# Patient Record
Sex: FEMALE | Race: WHITE | NOT HISPANIC OR LATINO | Employment: OTHER | URBAN - METROPOLITAN AREA
[De-identification: names, ages, dates, MRNs, and addresses within clinical notes are randomized per-mention and may not be internally consistent; named-entity substitution may affect disease eponyms.]

---

## 2021-11-09 ENCOUNTER — TELEPHONE (OUTPATIENT)
Dept: GASTROENTEROLOGY | Facility: CLINIC | Age: 71
End: 2021-11-09
Payer: COMMERCIAL

## 2021-11-09 NOTE — TELEPHONE ENCOUNTER
[facility] Call Center    Phone Message    May a detailed message be left on voicemail: yes     Reason for Call: Other: Pt is requesting a call back. Thank you.     Action Taken: Message routed to:  [facility]  gi    Travel Screening: Not Applicable

## 2021-11-09 NOTE — TELEPHONE ENCOUNTER
From: Brayden Miller MD   Sent: 2021   3:19 PM CDT   To: Maria Elena Arboleda RN   Subject: Luc[facility] patient                             Flor Camarena from [facility] said she submitted a referral for this patient with recurrent C. Diff.     Karlene Naik    1950   Telephone: [phone]     The patient is not in our system that I can see. Can you schedule her please for ? I talked to the patient -- seems appropriate. However, I don't have records.       - Patient has been registered and scheduled.  Message left for patient to return call, will need verbal consent to gain access to health systems with history of C.diff in Care Everywhere.    Maria Elena Arboleda RN

## 2021-11-10 NOTE — TELEPHONE ENCOUNTER
Patient contacted, gives verbal consent to access her [facility] records through Care Everywhere.  Patient states she was sent a text to set up MyChart but is having difficulty.   Sent email to patient for MyChart activation.      Maria Elena Arboleda RN

## 2021-11-24 ENCOUNTER — OFFICE VISIT (OUTPATIENT)
Dept: GASTROENTEROLOGY | Facility: CLINIC | Age: 71
End: 2021-11-24
Payer: COMMERCIAL

## 2021-11-24 VITALS
SYSTOLIC BLOOD PRESSURE: 143 MMHG | WEIGHT: 161.4 LBS | HEART RATE: 78 BPM | OXYGEN SATURATION: 98 % | DIASTOLIC BLOOD PRESSURE: 86 MMHG

## 2021-11-24 DIAGNOSIS — A04.71 RECURRENT CLOSTRIDIOIDES DIFFICILE DIARRHEA: ICD-10-CM

## 2021-11-24 DIAGNOSIS — B37.31 CANDIDIASIS OF VAGINA: Primary | ICD-10-CM

## 2021-11-24 PROCEDURE — 99205 OFFICE O/P NEW HI 60 MIN: CPT | Performed by: INTERNAL MEDICINE

## 2021-11-24 RX ORDER — ASPIRIN 81 MG/1
81 TABLET ORAL DAILY
COMMUNITY

## 2021-11-24 RX ORDER — GLUCOSAMINE/CHONDR SU A SOD 750-600 MG
1 TABLET ORAL DAILY
COMMUNITY

## 2021-11-24 RX ORDER — HYDROCORTISONE 2.5 %
CREAM (GRAM) TOPICAL 2 TIMES DAILY PRN
COMMUNITY
Start: 2021-09-27

## 2021-11-24 RX ORDER — VANCOMYCIN HYDROCHLORIDE 125 MG/1
125 CAPSULE ORAL DAILY
COMMUNITY
Start: 2021-11-04 | End: 2021-12-04

## 2021-11-24 RX ORDER — ACETAMINOPHEN 500 MG
1000 TABLET ORAL DAILY
COMMUNITY

## 2021-11-24 RX ORDER — ATORVASTATIN CALCIUM 40 MG/1
1 TABLET, FILM COATED ORAL DAILY
COMMUNITY
Start: 2021-10-13

## 2021-11-24 RX ORDER — ESTRADIOL 0.1 MG/G
CREAM VAGINAL
COMMUNITY
Start: 2021-10-13

## 2021-11-24 RX ORDER — FLUCONAZOLE 150 MG/1
150 TABLET ORAL ONCE
Qty: 1 TABLET | Refills: 0 | Status: SHIPPED | OUTPATIENT
Start: 2021-11-24 | End: 2021-11-24

## 2021-11-24 ASSESSMENT — PAIN SCALES - GENERAL: PAINLEVEL: NO PAIN (0)

## 2021-11-24 NOTE — PROGRESS NOTES
Visit Date: 11/24/2021    HISTORY OF PRESENT ILLNESS:  The patient is a 71-year-old woman who was referred from [facility] by Flor Camarena for evaluation of recurrent C. difficile infections and possible treatment.  The patient had exposure to clindamycin in mid July of this year and developed diarrhea end of July, beginning of August.  On 0816/2021, she was diagnosed with C. difficile infection by stool testing.  Other enteric pathogens were negative.  Abdominal CT at the time was unremarkable.  She was having 6-8 bowel movements per day with mucus, some blood, urgency and near incontinence, although she denies fecal incontinence.  She was given a 10-day course of vancomycin, which helped; however, 6-8 days after discontinuing it, she had a relapse of her symptoms.  She was put on a vancomycin taper at that time and relapsed after that with a documented positive test on 10/22/2021.  She has been on vancomycin since that time again with symptom control.  Her past medical history is notable for increased cholesterol, esophageal reflux disease, for which she is on omeprazole, osteoarthritis.  Her surgical history is quite limited.  She had tonsillectomy and tubal ligation.  She does not have frequent infections.  She gets urinary tract infections about once or twice a year.  She did have some dysuria in the middle of the C. diff story, but that was treated with cranberry juice.  The patient has history of some diarrheal illness that happened in 1980s for 6 months or so and then developed some post-infection irritable bowel syndrome, which apparently resolved when she limited her coffee intake and has not been a problem since.  She had a colonoscopy done in 2015 notable for diverticulosis.  She carries a history of having colon polyps.  She was told to repeat the colonoscopy in 5 years, which she is not planning to do because of bad experiences with colonoscopies before including reaction to sedatives.  Recently,  she has developed perianal, perivulvar rash, which is somewhat painful and itchy, and she was wondering if that is a reaction to the estrogen cream that she was prescribed or an allergic reaction to vancomycin.    PHYSICAL EXAMINATION:  The patient is in no acute distress and vitals are stable.  I did examine the perianal area, which looks erythematous and sharply demarcated with a couple of small red spots around most suggestive of a candidal infection.    SOCIAL HISTORY:  The patient has 2 daughters, both were vaginal deliveries and healthy.  One of the daughters is in [place], the other lives close by in [place] and [facility]    ASSESSMENT AND PLAN:    1.  The patient is a good candidate for intestinal microbiota transplant given that there is no diagnostic uncertainty.  Oral capsules is the preferred method and this will be arranged next week.  The patient should stay on vancomycin until 2 days before the oral capsules are administered.  I discussed the investigational nature of intestinal microbiota transplant, as well as multiple theoretical risk factors.  The treatment has approximately 80% success in breaking the cycle of C. difficile recurrence.  We do have a protocol for failures which also involves the capsules.  The patient is hesitant to have colonoscopy, but that is a potential third option in case the first two fail.  2.  The patient has likely vaginal candidiasis.  I prescribed 150 mg of fluconazole.  3.  History of colon polyps, last colonoscopy was done when she was 65 years old, and at that time, she had diverticulosis and no polyps.  It may be reasonable to continue colon cancer screening with less invasive means such as a Cologuard, although that can be positive if she continues to experience hemorrhoidal bleeding.    Total time in this visit was 75 minutes including review of medical records, documentation, coordination of care, and the visit itself.    Brayden Miller,  MD        D: 2021   T: 2021   MT: JACKIE    Name:     JOHNY RÍOS  MRN:      [id number]        Account:    [id number]   :      1950           Visit Date: 2021     Document: [id number]

## 2021-11-24 NOTE — NURSING NOTE
Karlene Naik's goals for this visit include:   Chief Complaint   Patient presents with     Consult     Recurrent C. Diff       She requests these members of her care team be copied on today's visit information: PCP    PCP: Flor Camarena    Referring Provider:  No referring provider defined for this encounter.    BP (!) 143/86 (BP Location: Left arm, Patient Position: Sitting, Cuff Size: Adult Regular)   Pulse 78   Wt 73.2 kg (161 lb 6.4 oz)   SpO2 98%     Do you need any medication refills at today's visit? No    Rani Hanna LPN

## 2021-11-24 NOTE — LETTER
11/24/2021         RE: Karlene Naik  [id number] [facility] [address] [address]        Dear Colleague,    Thank you for referring your patient, Karlene Naik, to the [facility]. Please see a copy of my visit note below.    Note dictated.      Again, thank you for allowing me to participate in the care of your patient.        Sincerely,        Brayden Miller MD

## 2021-12-01 DIAGNOSIS — B37.31 CANDIDIASIS OF VAGINA: Primary | ICD-10-CM

## 2021-12-01 RX ORDER — FLUCONAZOLE 150 MG/1
150 TABLET ORAL DAILY
Qty: 3 TABLET | Refills: 0 | Status: SHIPPED | OUTPATIENT
Start: 2021-12-01 | End: 2021-12-04

## 2021-12-12 ENCOUNTER — HEALTH MAINTENANCE LETTER (OUTPATIENT)
Age: 71
End: 2021-12-12

## 2022-01-04 ENCOUNTER — ALLIED HEALTH/NURSE VISIT (OUTPATIENT)
Dept: GASTROENTEROLOGY | Facility: CLINIC | Age: 72
End: 2022-01-04
Payer: COMMERCIAL

## 2022-01-04 DIAGNOSIS — A04.71 RECURRENT CLOSTRIDIOIDES DIFFICILE DIARRHEA: Primary | ICD-10-CM

## 2022-01-04 PROCEDURE — 99207 PR NO CHARGE LOS: CPT | Performed by: INTERNAL MEDICINE

## 2022-01-07 NOTE — PROGRESS NOTES
Writer met with pt to facilitate an Intestinal Microbiota Transplant via oral capsules.  Instructions and supporting handouts were provided and reviewed.   Informed consent form was completed, a copy will be mailed.  Pt is enrolled in a [facility] study that will assess body composition following IMT;   IRB [id number].    Last vancomycin taken morning of January 3, plan for capsule ingestion January 5, 2022.    Donor , 8.0 x 10^11 cells, lot prepared [id number]

## 2022-10-03 ENCOUNTER — HEALTH MAINTENANCE LETTER (OUTPATIENT)
Age: 72
End: 2022-10-03

## 2022-10-07 ENCOUNTER — ALLIED HEALTH/NURSE VISIT (OUTPATIENT)
Dept: NURSING | Facility: CLINIC | Age: 72
End: 2022-10-07
Payer: COMMERCIAL

## 2022-10-07 ENCOUNTER — TELEPHONE (OUTPATIENT)
Dept: GASTROENTEROLOGY | Facility: CLINIC | Age: 72
End: 2022-10-07

## 2022-10-07 VITALS
TEMPERATURE: 98.3 F | OXYGEN SATURATION: 99 % | HEART RATE: 72 BPM | RESPIRATION RATE: 20 BRPM | SYSTOLIC BLOOD PRESSURE: 154 MMHG | DIASTOLIC BLOOD PRESSURE: 92 MMHG | WEIGHT: 157 LBS

## 2022-10-07 DIAGNOSIS — R35.0 URINARY FREQUENCY: Primary | ICD-10-CM

## 2022-10-07 DIAGNOSIS — R39.15 URINARY URGENCY: Primary | ICD-10-CM

## 2022-10-07 PROBLEM — Z85.828 HISTORY OF BASAL CELL CARCINOMA: Status: ACTIVE | Noted: 2022-04-01

## 2022-10-07 PROBLEM — I47.19 ATRIAL TACHYCARDIA (H): Status: ACTIVE | Noted: 2019-08-22

## 2022-10-07 PROBLEM — Z86.19 HX OF CLOSTRIDIUM DIFFICILE INFECTION: Status: ACTIVE | Noted: 2021-10-13

## 2022-10-07 LAB
ALBUMIN UR-MCNC: NEGATIVE MG/DL
APPEARANCE UR: CLEAR
BILIRUB UR QL STRIP: NEGATIVE
COLOR UR AUTO: COLORLESS
GLUCOSE UR STRIP-MCNC: NEGATIVE MG/DL
HGB UR QL STRIP: ABNORMAL
KETONES UR STRIP-MCNC: NEGATIVE MG/DL
LEUKOCYTE ESTERASE UR QL STRIP: ABNORMAL
NITRATE UR QL: NEGATIVE
PH UR STRIP: 6 [PH] (ref 5–7)
RBC #/AREA URNS AUTO: ABNORMAL /HPF
SKIP: ABNORMAL
SP GR UR STRIP: 1 (ref 1–1.03)
UROBILINOGEN UR STRIP-MCNC: NORMAL MG/DL
WBC #/AREA URNS AUTO: ABNORMAL /HPF

## 2022-10-07 PROCEDURE — 96372 THER/PROPH/DIAG INJ SC/IM: CPT | Performed by: INTERNAL MEDICINE

## 2022-10-07 PROCEDURE — 81001 URINALYSIS AUTO W/SCOPE: CPT | Performed by: INTERNAL MEDICINE

## 2022-10-07 RX ORDER — GENTAMICIN 40 MG/ML
160 INJECTION, SOLUTION INTRAMUSCULAR; INTRAVENOUS ONCE
Status: COMPLETED | OUTPATIENT
Start: 2022-10-07 | End: 2022-10-07

## 2022-10-07 RX ADMIN — GENTAMICIN 160 MG: 40 INJECTION, SOLUTION INTRAMUSCULAR; INTRAVENOUS at 15:26

## 2022-10-07 ASSESSMENT — PAIN SCALES - GENERAL: PAINLEVEL: NO PAIN (0)

## 2022-10-07 NOTE — TELEPHONE ENCOUNTER
----- Message from Duglas Fan RN sent at 10/7/2022  1:55 PM CDT -----  Regarding: RE: URGENT  I attempted to call the patient. I left a message to call back.    Duglas  ----- Message -----  From: Brayden Miller MD  Sent: 10/7/2022   1:49 PM CDT  To: Dunia Arenas RN, #  Subject: URGENT                                           I sent this patient to the [facility] to get her UTI symptoms treated. The protocol is to get UA/UC and give a shot of gentamicin 160 mg IM. If UA is positive, she would get the second and third shots next week (80 mg each).    I am not sure how to communicate these Friday emergencies other than sending messages.    Aldair Lester

## 2022-10-07 NOTE — PROGRESS NOTES
Patient sent in to clinic by Dr. Miller for UTI sx. Per Dr. Miller to initiate Gentamicin IM protocol for UTI treatment in cdiff patient.     UA sent down to lab to be process. Per Dr. Miller give loading dose of gentamicin 160mg and then wait for UA to come back to proceed with further doses. Instructed patient that someone would give a call back once UA has been processed to determine if further doses are needed.     Patient had no further instructions at this time.     Clinic Administered Medication Documentation    Administrations This Visit     gentamicin (GARAMYCIN) injection 160 mg     Admin Date  10/07/2022 Action  New Bag Dose  160 mg Route  Intramuscular Site  Left Ventrogluteal Administered By  Duglas Fan RN    Ordering Provider: Brayden Miller MD    NDC: 75031-446-92    Lot#: [id number]    : [employer]    Patient Supplied?: No                  Injectable Medication Documentation    Patient was given Gentamicin Sulfate . Prior to medication administration, verified patients identity using patient s name and date of birth. Please see MAR and medication order for additional information. Patient instructed to remain in clinic for 15 minutes and report any adverse reaction to staff immediately .      Was entire vial of medication used? Yes  Vial/Syringe: Single dose vial  Expiration Date:  3/2023  Was this medication supplied by the patient? No    Name of provider who requested the medication administration: Brayden Miller MD  Name of provider on site (faculty or community preceptor) at the time of performing the medication administration: Vida Ba MD    Date of next administration: Possibly 10/10/22  Date of next office visit with provider to renew medication plan (must be seen annually):     Duglas Fan RN

## 2022-10-10 ENCOUNTER — ALLIED HEALTH/NURSE VISIT (OUTPATIENT)
Dept: NURSING | Facility: CLINIC | Age: 72
End: 2022-10-10
Payer: COMMERCIAL

## 2022-10-10 ENCOUNTER — TELEPHONE (OUTPATIENT)
Dept: GASTROENTEROLOGY | Facility: CLINIC | Age: 72
End: 2022-10-10

## 2022-10-10 VITALS
SYSTOLIC BLOOD PRESSURE: 133 MMHG | DIASTOLIC BLOOD PRESSURE: 79 MMHG | OXYGEN SATURATION: 99 % | HEART RATE: 79 BPM | TEMPERATURE: 97 F

## 2022-10-10 DIAGNOSIS — R35.0 URINARY FREQUENCY: Primary | ICD-10-CM

## 2022-10-10 DIAGNOSIS — R39.15 URINARY URGENCY: Primary | ICD-10-CM

## 2022-10-10 PROCEDURE — 96372 THER/PROPH/DIAG INJ SC/IM: CPT | Performed by: INTERNAL MEDICINE

## 2022-10-10 RX ORDER — GENTAMICIN 40 MG/ML
80 INJECTION, SOLUTION INTRAMUSCULAR; INTRAVENOUS ONCE
Status: COMPLETED | OUTPATIENT
Start: 2022-10-11 | End: 2022-10-11

## 2022-10-10 RX ORDER — GENTAMICIN 40 MG/ML
80 INJECTION, SOLUTION INTRAMUSCULAR; INTRAVENOUS ONCE
Status: COMPLETED | OUTPATIENT
Start: 2022-10-10 | End: 2022-10-10

## 2022-10-10 RX ADMIN — GENTAMICIN 80 MG: 40 INJECTION, SOLUTION INTRAMUSCULAR; INTRAVENOUS at 14:38

## 2022-10-10 NOTE — TELEPHONE ENCOUNTER
Attempted to reach pt to update on providers instruction to get 2nd and 3rd doses of gentamycin. No answer, detailed message left. Instructed to call back [phone] option 3.    Cristina Boyd RN

## 2022-10-10 NOTE — PROGRESS NOTES
Patient arrived for 2nd of 3 gentamicin doses for UTI treatment in [facility] patient   Denies any adverse reaction /issues from previous dose on 10/7/2022.   VSS.     Clinic Administered Medication Documentation    Administrations This Visit     gentamicin (GARAMYCIN) injection 80 mg     Admin Date  10/10/2022 Action  New Bag Dose  80 mg Route  Intramuscular Site  Left Ventrogluteal Administered By  Constance Abbasi RN    Ordering Provider: Brayden Miller MD    Patient Supplied?: No    Comments: Left ventrogluteal                Injectable Medication Documentation    Patient was given Gentamicin Sulfate . Prior to medication administration, verified patients identity using patient s name and date of birth. Please see MAR and medication order for additional information. Patient instructed to remain in clinic for 15 minutes and report any adverse reaction to staff immediately .      Was entire vial of medication used? Yes  Vial/Syringe: Single dose vial  Expiration Date:    Was this medication supplied by the patient? No    Name of provider who requested the medication administration: Dr. Miller  Name of provider on site (faculty or community preceptor) at the time of performing the medication administration: Rolf Berumen PA-C.    Date of next administration: 10/11/2022  Date of next office visit with provider to renew medication plan (must be seen annually):    Constance Abbasi RN

## 2022-10-10 NOTE — TELEPHONE ENCOUNTER
Patient call to schedule her 2nd and 3rd gentamicin doses. Patient will come in today at 230pm and again tomorrow at 230pm. Visits scheduled.    Cristina Boyd RN

## 2022-10-10 NOTE — TELEPHONE ENCOUNTER
Spoke with provider re: 2nd and 3 rd dose of gentamicin.   Provider stated that patient should get these completed.   Will reach out to patient to schedule.     Constance Abbasi RN

## 2022-10-11 ENCOUNTER — ALLIED HEALTH/NURSE VISIT (OUTPATIENT)
Dept: NURSING | Facility: CLINIC | Age: 72
End: 2022-10-11
Payer: COMMERCIAL

## 2022-10-11 DIAGNOSIS — R39.15 URINARY URGENCY: Primary | ICD-10-CM

## 2022-10-11 PROCEDURE — 96372 THER/PROPH/DIAG INJ SC/IM: CPT | Performed by: INTERNAL MEDICINE

## 2022-10-11 RX ADMIN — GENTAMICIN 80 MG: 40 INJECTION, SOLUTION INTRAMUSCULAR; INTRAVENOUS at 15:16

## 2022-10-11 NOTE — NURSING NOTE
Chief Complaint   Patient presents with     Imm/Inj     Gentamycin injection, 3rd dose.       Clinic Administered Medication Documentation          Injectable Medication Documentation    Patient was given Gentamicin Sulfate . Prior to medication administration, verified patients identity using patient s name and date of birth. Please see MAR and medication order for additional information. Patient instructed to remain in clinic for 15 minutes.    The following medication was given:     MEDICATION: Gentamycin  ROUTE: Injection  SITE: Ventrogluteal - Right  DOSE: 2mL  LOT #: [id number]  :  [employer]  EXPIRATION DATE:  03/2023  NDC#: 15204-097-96  Was entire vial of medication used? Yes  Vial/Syringe: Single dose vial  Was this medication supplied by the patient? No       This service provided today was under the supervising provider of the day Rolf Berumen PA-C, who was available if needed.        Johanna Lindsay CMA

## 2022-10-12 ENCOUNTER — DOCUMENTATION ONLY (OUTPATIENT)
Dept: GASTROENTEROLOGY | Facility: CLINIC | Age: 72
End: 2022-10-12

## 2022-10-12 NOTE — PROGRESS NOTES
Late entry.   10/11/2022 ~2:45pm  Patient inquired about a symptom after the second gentamicin injection on 10/10.   The injection was done around 2:30pm- patient staed that around 7pm she noted a rotten egg smell/taste. Writer inquired if she experienced that with the first injection the previous Friday. Patient stated not that she was aware of.   Patient stated the sx lasted until she went to bed around 10 pm, but when she awoke this AM, it was not present. Denies other sxs like fever, nausea, body aches, redness at site, etc.   Does not c/o this sx currently.   Writer informed patient to reach out if she experiences this same thing after the 3rd dose (10/11/2022). Patient verbalized understanding.     Constance Abbasi RN

## 2023-02-11 ENCOUNTER — HEALTH MAINTENANCE LETTER (OUTPATIENT)
Age: 73
End: 2023-02-11

## 2023-10-02 ENCOUNTER — TELEPHONE (OUTPATIENT)
Dept: GASTROENTEROLOGY | Facility: CLINIC | Age: 73
End: 2023-10-02
Payer: COMMERCIAL

## 2023-10-02 NOTE — TELEPHONE ENCOUNTER
[facility] Call Center    Phone Message    May a detailed message be left on voicemail: yes     Reason for Call: Other: Patient is having many episodes of diarrhea after her flu shot yesterday. Please call back to discuss     Action Taken: Message routed to:  [facility] gi    Travel Screening: Not Applicable

## 2023-10-03 NOTE — TELEPHONE ENCOUNTER
Attempted to reach patient, no answer, message left with call back info provided. Writer also sent a [employer] message to update as well.    Cristina Boyd RN

## 2023-10-04 NOTE — TELEPHONE ENCOUNTER
Patient responded to [employer] message that she will reach out to her Primary care physician.    Cristina Boyd RN

## 2024-03-21 ENCOUNTER — TELEPHONE (OUTPATIENT)
Dept: GASTROENTEROLOGY | Facility: CLINIC | Age: 74
End: 2024-03-21
Payer: COMMERCIAL

## 2024-03-21 DIAGNOSIS — R35.0 URINARY FREQUENCY: Primary | ICD-10-CM

## 2024-03-21 NOTE — TELEPHONE ENCOUNTER
Attempt to reach patient, no answer, message left with call back info provided.  Orders entered for UA/UC, recent creatinine is normal.    Cristina Boyd RN

## 2024-03-21 NOTE — TELEPHONE ENCOUNTER
[facility] Call [facility]    Phone Message    May a detailed message be left on voicemail: yes     Reason for Call: Other: Pt took a home test and believes she has another UTI. Pt states she has to normally get a specialized antibiotic, a gentamicin injection from Dr Miller. Please review and call pt back to further discuss and advise. Thank you!     Action Taken: Message routed to:  [facility]  [facility] [[id number]]    Travel Screening: Not Applicable

## 2024-03-22 ENCOUNTER — LAB (OUTPATIENT)
Dept: LAB | Facility: CLINIC | Age: 74
End: 2024-03-22
Payer: COMMERCIAL

## 2024-03-22 DIAGNOSIS — R35.0 URINARY FREQUENCY: ICD-10-CM

## 2024-03-22 LAB
ALBUMIN UR-MCNC: NEGATIVE MG/DL
APPEARANCE UR: CLEAR
BILIRUB UR QL STRIP: NEGATIVE
COLOR UR AUTO: COLORLESS
GLUCOSE UR STRIP-MCNC: NEGATIVE MG/DL
HGB UR QL STRIP: NEGATIVE
KETONES UR STRIP-MCNC: NEGATIVE MG/DL
LEUKOCYTE ESTERASE UR QL STRIP: NEGATIVE
NITRATE UR QL: NEGATIVE
PH UR STRIP: 7.5 [PH] (ref 5–7)
SKIP: ABNORMAL
SP GR UR STRIP: 1 (ref 1–1.03)
UROBILINOGEN UR STRIP-MCNC: NORMAL MG/DL

## 2024-03-22 PROCEDURE — 81003 URINALYSIS AUTO W/O SCOPE: CPT

## 2024-03-22 NOTE — TELEPHONE ENCOUNTER
[facility] Call [facility]    Phone Message    May a detailed message be left on voicemail: yes     Reason for Call: Other: Pt returning Cristina's call requesting she please reach back out. Thank you!     Action Taken: Message routed to:  [facility] GI    Travel Screening: Not Applicable

## 2024-03-22 NOTE — TELEPHONE ENCOUNTER
Call back to patient. Patient is reporting that she tested herself with a home UTI testing kit. Patient is reporting that she has had urgency this week with slight burning, denies a fever. Patient will go in to lab and submit a urine sample today. Patient would like to wait until culture comes back to ensure we treat with the correct antibiotics. Patient understands that if she becomes more symptomatic and needs help ASAP she will go to the ER and bring the letter from Dr. Miller with specific UTI treatment protocols.     Cristina Boyd RN

## 2024-03-26 ENCOUNTER — TELEPHONE (OUTPATIENT)
Dept: GASTROENTEROLOGY | Facility: CLINIC | Age: 74
End: 2024-03-26
Payer: COMMERCIAL

## 2024-03-26 NOTE — TELEPHONE ENCOUNTER
Call to patient to discuss UA results. No answer, message left with call back info provided    Cristina Boyd RN

## 2024-03-27 NOTE — TELEPHONE ENCOUNTER
Patient returned call to writer. Patient is reporting that her symptoms have resolved. Writer did advise on UA results which do not show a UTI therefore a culture was not done. Patient verbalized understanding and will call back if she has any other questions or concerns.    Cristina Boyd RN

## 2024-03-27 NOTE — TELEPHONE ENCOUNTER
Attempt #2 to reach patient with UA results. No answer, message left with call back info provided.    Cristina Boyd RN

## 2024-04-26 ENCOUNTER — NURSE TRIAGE (OUTPATIENT)
Dept: GASTROENTEROLOGY | Facility: CLINIC | Age: 74
End: 2024-04-26

## 2024-04-26 ENCOUNTER — ALLIED HEALTH/NURSE VISIT (OUTPATIENT)
Dept: NURSING | Facility: CLINIC | Age: 74
End: 2024-04-26
Payer: COMMERCIAL

## 2024-04-26 DIAGNOSIS — R31.9 URINARY TRACT INFECTION WITH HEMATURIA: ICD-10-CM

## 2024-04-26 DIAGNOSIS — N39.0 URINARY TRACT INFECTION: Primary | ICD-10-CM

## 2024-04-26 DIAGNOSIS — R31.9 URINARY TRACT INFECTION WITH HEMATURIA: Primary | ICD-10-CM

## 2024-04-26 DIAGNOSIS — N39.0 URINARY TRACT INFECTION WITH HEMATURIA: ICD-10-CM

## 2024-04-26 DIAGNOSIS — N39.0 URINARY TRACT INFECTION WITH HEMATURIA: Primary | ICD-10-CM

## 2024-04-26 LAB
ALBUMIN UR-MCNC: 100 MG/DL
APPEARANCE UR: ABNORMAL
BACTERIA #/AREA URNS HPF: ABNORMAL /HPF
BILIRUB UR QL STRIP: NEGATIVE
COLOR UR AUTO: ABNORMAL
GLUCOSE UR STRIP-MCNC: NEGATIVE MG/DL
HGB UR QL STRIP: ABNORMAL
KETONES UR STRIP-MCNC: NEGATIVE MG/DL
LEUKOCYTE ESTERASE UR QL STRIP: ABNORMAL
NITRATE UR QL: NEGATIVE
PH UR STRIP: 6 [PH] (ref 5–7)
RBC #/AREA URNS AUTO: >100 /HPF
SKIP: ABNORMAL
SP GR UR STRIP: 1.02 (ref 1–1.03)
UROBILINOGEN UR STRIP-MCNC: NORMAL MG/DL
WBC #/AREA URNS AUTO: >100 /HPF

## 2024-04-26 PROCEDURE — 96372 THER/PROPH/DIAG INJ SC/IM: CPT | Performed by: PHYSICIAN ASSISTANT

## 2024-04-26 PROCEDURE — 81001 URINALYSIS AUTO W/SCOPE: CPT

## 2024-04-26 PROCEDURE — 87086 URINE CULTURE/COLONY COUNT: CPT

## 2024-04-26 RX ORDER — GENTAMICIN 40 MG/ML
160 INJECTION, SOLUTION INTRAMUSCULAR; INTRAVENOUS ONCE
Status: COMPLETED | OUTPATIENT
Start: 2024-04-26 | End: 2024-04-26

## 2024-04-26 RX ORDER — GENTAMICIN 40 MG/ML
80 INJECTION, SOLUTION INTRAMUSCULAR; INTRAVENOUS DAILY
Status: COMPLETED | OUTPATIENT
Start: 2024-04-29 | End: 2024-04-30

## 2024-04-26 RX ADMIN — GENTAMICIN 160 MG: 40 INJECTION, SOLUTION INTRAMUSCULAR; INTRAVENOUS at 12:21

## 2024-04-26 NOTE — TELEPHONE ENCOUNTER
Call back to patient.  UTI symptoms of pain with urination, blood in urine and urgency, not sure of fever but denies chills.    OK per provider to treat UTI with Gent protocol before UA/UC comes back.    Orders entered for UA/UC and gentamicin to be given in clinic. Nurse visits scheduled.    Patient is aware.    Cristina Boyd RN

## 2024-04-26 NOTE — TELEPHONE ENCOUNTER
Caller reporting the following red-flag symptom(s): UTI, Blood in Urine    Per the system red-flag symptom policy, patient was instructed to:  speak with a Registered Nurse    Action:  Patient warm transferred to a Registered Nurse

## 2024-04-26 NOTE — PROGRESS NOTES
Karlene Naik comes into clinic today at the request of Dr. Miller Ordering Provider for Med Injection only Gentamycin .        This service provided today was under the supervising provider of the day Dr. Carroll, who was available if needed.    Cristina Boyd RN     Clinic Administered Medication Documentation      Injectable Medication Documentation    Is there an active order (written within the past 365 days, with administrations remaining, not ) in the chart? Yes.     Patient was given Gentamicin Sulfate . Prior to medication administration, verified patient's identity using patient s name and date of birth. Please see MAR and medication order for additional information. Patient instructed to remain in clinic for 15 minutes and report any adverse reaction to staff immediately.    Vial/Syringe: Single dose vial. Was entire vial of medication used? Yes  Was this medication supplied by the patient? No  Is this a medication the patient will need to receive again? No - not necessary to check for refills remaining.

## 2024-04-26 NOTE — TELEPHONE ENCOUNTER
Nurse Triage SBAR    Is this a 2nd Level Triage? YES, LICENSED PRACTITIONER REVIEW IS REQUIRED    Situation: Asking for UA/ UC order as previous. SYMPTOMS TODAY( HX CDIFF). Dysuria/ Pain and urgency without fever.   -C-diff-  in past, and has received Gentamicin by injection for treatment in past due to hx of C-DIFF and treated by Dr Miller to avoid recurrence of C-DIFF, hence call via GI Service line  -Symptoms x 2 days- came on suddenly, dysuria,. She is uncomfortable and would come now to [place] for UA/UC  - last UA/ UC done 3/22/24 under same process  Background: C-DIFF in past> Angela consult note 11/24/21   1.  The patient is a good candidate for intestinal microbiota transplant given that there is no diagnostic uncertainty.  Oral capsules is the preferred method and this will be arranged next week.  The patient should stay on vancomycin until 2 days before the oral capsules are administered.  I discussed the investigational nature of intestinal microbiota transplant, as well as multiple theoretical risk factors.  The treatment has approximately 80% success in breaking the cycle of C. difficile recurrence.  We do have a protocol for failures which also involves the capsules.  The patient is hesitant to have colonoscopy, but that is a potential third option in case the first two fail.  2.  The patient has likely vaginal candidiasis.  I prescribed 150 mg of fluconazole.  3.  History of colon polyps, last colonoscopy was done when she was 65 years old, and at that time, she had diverticulosis and no polyps.  It may be reasonable to continue colon cancer screening with less invasive means such as a Cologuard, although that can be positive if she continues to experience hemorrhoidal bleeding.       Assessment: requesting UA UC and possible Gentamicin injection as appropriate per Dr Miller    Protocol Recommended Disposition:     Patient understands that if she becomes more symptomatic ( fever> 101.5,  weakness, retention, hematuria)and needs help ASAP she will go to the ER    and bring her letter from Dr. Miller with specific UTI treatment protocols.     Recommendation: Please advise/ place order as appropriate    Routed to provider team    Does the patient meet one of the following criteria for ADS visit consideration? No    Reason for Disposition   Pain or burning with passing urine (urination) and female   Age > 50 years    Additional Information   Negative: Shock suspected (e.g., cold/pale/clammy skin, too weak to stand, low BP, rapid pulse)   Negative: Sounds like a life-threatening emergency to the triager   Negative: Taking antibiotic for urinary tract infection (UTI)   Negative: Shock suspected (e.g., cold/pale/clammy skin, too weak to stand, low BP, rapid pulse)   Negative: Sounds like a life-threatening emergency to the triager   Negative: Unable to urinate (or only a few drops) and bladder feels very full   Negative: Vomiting   Negative: Patient sounds very sick or weak to the triager   Negative: Fever > 100.4 F (38.0 C)   Negative: Unusual vaginal discharge   Negative: Taking antibiotic > 24 hours for UTI and fever persists    Protocols used: Urinary Symptoms-A-OH, Urination Pain - Female-A-OH

## 2024-04-28 LAB — BACTERIA UR CULT: NO GROWTH

## 2024-04-29 ENCOUNTER — ALLIED HEALTH/NURSE VISIT (OUTPATIENT)
Dept: NURSING | Facility: CLINIC | Age: 74
End: 2024-04-29
Payer: COMMERCIAL

## 2024-04-29 DIAGNOSIS — N39.0 URINARY TRACT INFECTION: Primary | ICD-10-CM

## 2024-04-29 PROCEDURE — 96372 THER/PROPH/DIAG INJ SC/IM: CPT | Performed by: PHYSICIAN ASSISTANT

## 2024-04-29 RX ADMIN — GENTAMICIN 80 MG: 40 INJECTION, SOLUTION INTRAMUSCULAR; INTRAVENOUS at 12:06

## 2024-04-30 ENCOUNTER — ALLIED HEALTH/NURSE VISIT (OUTPATIENT)
Dept: NURSING | Facility: CLINIC | Age: 74
End: 2024-04-30
Payer: COMMERCIAL

## 2024-04-30 DIAGNOSIS — N39.0 URINARY TRACT INFECTION: Primary | ICD-10-CM

## 2024-04-30 PROCEDURE — 96372 THER/PROPH/DIAG INJ SC/IM: CPT | Performed by: PHYSICIAN ASSISTANT

## 2024-04-30 RX ADMIN — GENTAMICIN 80 MG: 40 INJECTION, SOLUTION INTRAMUSCULAR; INTRAVENOUS at 12:00

## 2024-04-30 ASSESSMENT — PAIN SCALES - GENERAL: PAINLEVEL: NO PAIN (0)

## 2024-04-30 NOTE — NURSING NOTE
Clinic Administered Medication Documentation      Karlene Naik comes into clinic today at the request of Dr. Miller Ordering Provider for Med Injection only Gentamycin    This service provided today was under the supervising provider of the day Rolf Berumen PA-C, who was available if needed.      Injectable Medication Documentation    Is there an active order (written within the past 365 days, with administrations remaining, not ) in the chart? Yes.     Patient was given Gentamicin Sulfate . Prior to medication administration, verified patient's identity using patient s name and date of birth. Please see MAR and medication order for additional information. Patient instructed to remain in clinic for 15 minutes and report any adverse reaction to staff immediately.    Vial/Syringe: Single dose vial. Was entire vial of medication used? Yes  Was this medication supplied by the patient? No  Is this a medication the patient will need to receive again? No - not necessary to check for refills remaining.         Johanna Lindsay, [facility]

## 2024-12-14 ENCOUNTER — HEALTH MAINTENANCE LETTER (OUTPATIENT)
Age: 74
End: 2024-12-14